# Patient Record
Sex: FEMALE | Race: WHITE
[De-identification: names, ages, dates, MRNs, and addresses within clinical notes are randomized per-mention and may not be internally consistent; named-entity substitution may affect disease eponyms.]

---

## 2018-11-29 ENCOUNTER — HOSPITAL ENCOUNTER (OUTPATIENT)
Dept: HOSPITAL 59 - ER | Age: 53
Setting detail: OBSERVATION
LOS: 2 days | Discharge: HOME | End: 2018-12-01
Attending: INTERNAL MEDICINE | Admitting: INTERNAL MEDICINE
Payer: MEDICARE

## 2018-11-29 DIAGNOSIS — G47.30: ICD-10-CM

## 2018-11-29 DIAGNOSIS — J18.8: Primary | ICD-10-CM

## 2018-11-29 DIAGNOSIS — M19.90: ICD-10-CM

## 2018-11-29 DIAGNOSIS — K21.9: ICD-10-CM

## 2018-11-29 DIAGNOSIS — Z98.890: ICD-10-CM

## 2018-11-29 DIAGNOSIS — M10.9: ICD-10-CM

## 2018-11-29 DIAGNOSIS — R32: ICD-10-CM

## 2018-11-29 DIAGNOSIS — G58.8: ICD-10-CM

## 2018-11-29 DIAGNOSIS — J44.9: ICD-10-CM

## 2018-11-29 DIAGNOSIS — M79.7: ICD-10-CM

## 2018-11-29 DIAGNOSIS — K57.92: ICD-10-CM

## 2018-11-29 DIAGNOSIS — Z87.891: ICD-10-CM

## 2018-11-29 LAB
ANION GAP SERPL CALC-SCNC: 12 MMOL/L (ref 7–16)
APTT BLD: 24.1 SECONDS (ref 24.5–39.1)
BUN SERPL-MCNC: 15 MG/DL (ref 6–20)
CO2 SERPL-SCNC: 26 MMOL/L (ref 22–29)
CREAT SERPL-MCNC: 1.1 MG/DL (ref 0.5–0.9)
ERYTHROCYTE [DISTWIDTH] IN BLOOD BY AUTOMATED COUNT: 13.1 % (ref 11.5–14.5)
EST GLOMERULAR FILTRATION RATE: 55 ML/MIN
GLUCOSE SERPL-MCNC: 152 MG/DL (ref 74–109)
HCT VFR BLD CALC: 40.7 % (ref 35–47)
HGB BLD-MCNC: 13.5 GM/DL (ref 11.6–16)
INR PPP: 1.1
LYMPHOCYTES NFR BLD: 9 % (ref 16–45)
MCH RBC QN AUTO: 30.4 PG (ref 27–33)
MCHC RBC AUTO-ENTMCNC: 33.2 G/DL (ref 32–36)
MCV RBC AUTO: 91.7 FL (ref 81–97)
MONOCYTES NFR BLD: 5 % (ref 0–9)
NEUTROPHILS NFR BLD AUTO: 86 % (ref 47–80)
PLATELET # BLD EST: NORMAL 10*3/UL
PLATELET # BLD: 181 K/UL (ref 130–400)
PMV BLD AUTO: 10.4 FL (ref 7.4–10.4)
PROTHROMBIN TIME: 10.7 SECONDS (ref 9.5–12.1)
RBC # BLD AUTO: 4.44 M/UL (ref 3.8–5.4)
RBC MORPH BLD: NORMAL
WBC # BLD AUTO: 9.9 K/UL (ref 4.2–12.2)

## 2018-11-29 PROCEDURE — 85610 PROTHROMBIN TIME: CPT

## 2018-11-29 PROCEDURE — 71045 X-RAY EXAM CHEST 1 VIEW: CPT

## 2018-11-29 PROCEDURE — 71275 CT ANGIOGRAPHY CHEST: CPT

## 2018-11-29 PROCEDURE — 96365 THER/PROPH/DIAG IV INF INIT: CPT

## 2018-11-29 PROCEDURE — 96374 THER/PROPH/DIAG INJ IV PUSH: CPT

## 2018-11-29 PROCEDURE — 99220: CPT

## 2018-11-29 PROCEDURE — 93005 ELECTROCARDIOGRAM TRACING: CPT

## 2018-11-29 PROCEDURE — 84484 ASSAY OF TROPONIN QUANT: CPT

## 2018-11-29 PROCEDURE — 94010 BREATHING CAPACITY TEST: CPT

## 2018-11-29 PROCEDURE — 93010 ELECTROCARDIOGRAM REPORT: CPT

## 2018-11-29 PROCEDURE — 99285 EMERGENCY DEPT VISIT HI MDM: CPT

## 2018-11-29 PROCEDURE — 80048 BASIC METABOLIC PNL TOTAL CA: CPT

## 2018-11-29 PROCEDURE — 85027 COMPLETE CBC AUTOMATED: CPT

## 2018-11-29 PROCEDURE — 85730 THROMBOPLASTIN TIME PARTIAL: CPT

## 2018-11-29 NOTE — EMERGENCY DEPARTMENT RECORD
History of Present Illness





- General


Chief Complaint: Chest Pain


Stated Complaint: CHEST PAIN


Time Seen by Provider: 18 19:56


Source: Patient, Family


Mode of Arrival: Ambulatory


Limitations: No limitations





- History of Present Illness


Initial Comments: 


54 yo female presents with mid chest pain.  The pain started at the time of her 

shoulder surgery today.  She states her mid chest hurt when she was laid back 

on the surgery table prior to the procedure.  The chest again hurt when she 

awoke from anesthesia.  The pain has been present since this morning around 

8am.  The pain is in the middle of the chest.  It does have a positional 

component to it.  No shortness of breath.  It is worse laying down and better 

standing up.  No cardiac history.  





MD Complaint: Chest pain


Onset/Timin


-: Hour(s)


Onset: Other (onset laying down for surgery today)


Pain Location: Substernal


Severity scale (1-10): 6


Quality: Aching, Dull, Heaviness


Consistency: Constant


Improves With: Leaning foward


Worsens With: Movement, Palpation, Other (Laying down)


Context: Recent surgery (this AM)


Anginal Symptoms: Dyspnea


Treatments Prior to Arrival: None





- Related Data


 Home Medications











 Medication  Instructions  Recorded  Confirmed  Last Taken


 


Oxycodone HCl/Acetaminophen 1 - 2 tab PO Q4H PRN 18





[Percocet 5mg/325mg]    











 Allergies











Allergy/AdvReac Type Severity Reaction Status Date / Time


 


latex Allergy Mild RASH Verified 18 19:29














Travel Screening





- Travel/Exposure Within Last 30 Days


Have you traveled within the last 30 days?: No





- Travel Symptoms


Symptom Screening: None





Review of Systems


Constitutional: Denies: Chills, Fever, Malaise, Weakness


Eyes: Denies: Eye discharge


ENT: Denies: Congestion, Throat pain


Respiratory: Denies: Cough, Dyspnea, Hemoptysis, Stridor, Wheezes


Cardiovascular: Reports: Chest pain.  Denies: Edema, Palpitations, Syncope


Endocrine: Denies: Fatigue


Gastrointestinal: Denies: Abdominal pain, Diarrhea, Nausea, Vomiting


Musculoskeletal: Denies: Arthralgia, Back pain, Myalgia


Skin: Denies: Bruising, Change in color, Rash


Neurological: Denies: Headache


Psychiatric: Denies: Anxiety


Hematological/Lymphatic: Denies: Blood Clots, Easy bleeding, Easy bruising





Past Medical History





- SOCIAL HISTORY


Smoking Status: Former smoker


Alcohol Use: Rare


Drug Use: Heavy


Drug Use Detail:: Marijuana





- RESPIRATORY


Hx Respiratory Disorders: Yes


Hx COPD: Yes (early stages)


Hx Sleep Apnea: Yes


Hx of CPAP: No





- CARDIOVASCULAR


Hx Cardio Disorders: Yes


Hx Palpitations: Yes


Comment:: Thickening around a heart valve





- NEURO


Hx Neuro Disorders: No





- GI


Hx GI Disorders: Yes


Hx Diverticulitis: Yes


Hx Reflux: Yes


Hx of Polyps: Yes (benign)





- 


Hx Genitourinary Disorders: Yes


Hx Bladder Problem: Yes (incontinence)





- ENDOCRINE


Hx Endocrine Disorders: No





- MUSCULOSKELETAL


Hx Musculoskeletal Disorders: Yes


Hx Arthritis: Yes (osteo)


Hx Fibromyalgia: Yes


Hx Gout: Yes





- PSYCH


Hx Psych Problems: Yes


Hx Anxiety: Yes


Hx Depression: Yes





- HEMATOLOGY/ONCOLOGY


Hx Hematology/Oncology Disorders: No





Family Medical History


Any Significant Family History?: Yes


Hx Cancer: Mother


Hx Heart Disease: Father, Brother/Sister





Physical Exam





- General


General Appearance: Alert, Oriented x3, Cooperative, No acute distress


Limitations: No limitations





- Head


Head exam: Atraumatic, Normal inspection





- Eye


Eye exam: Normal appearance.  negative: Conjunctival injection, Scleral icterus





- ENT


ENT exam: Normal exam


Ear exam: Normal external inspection


Nasal Exam: Normal inspection


Mouth exam: Normal external inspection





- Neck


Neck exam: Normal inspection





- Respiratory


Respiratory exam: Normal lung sounds bilaterally, Chest wall tenderness (mid to 

lower ).  negative: Accessory muscle use, Prolonged expiratory, Respiratory 

distress, Rhonchi, Stridor, Wheezes





- Cardiovascular


Cardiovascular Exam: Regular rate, Normal rhythm, Normal heart sounds





- GI/Abdominal


GI/Abdominal exam: Soft.  negative: Tenderness





- Rectal


Rectal exam: Deferred





- 


 exam: Deferred





- Extremities


Extremities exam: Normal inspection, Full ROM, Normal capillary refill.  

negative: Tenderness





- Back


Back exam: Reports: Normal inspection, Full ROM.  Denies: Muscle spasm, Rash 

noted, Tenderness





- Neurological


Neurological exam: Alert, Oriented X3





- Psychiatric


Psychiatric exam: Normal affect, Normal mood





- Skin


Skin exam: Dry, Intact, Normal color, Warm





Course





 Vital Signs











  18





  19:31


 


Temperature 98.4 F


 


Pulse Rate 104 H


 


Respiratory 20





Rate 


 


Blood Pressure 135/95


 


Pulse Ox 97














- Reevaluation(s)


Reevaluation #1: 


EKG 1933 Sinus Tach 107, intervals normal, axis normal, ST normal, No acute 

changes.  Normal except rate.  No significant changes from 18


Her physical examination is consistent with a chest wall etiology.  Her sternum 

is tender in the lower 1/2


The chest pain is atypical in that it started on the OR table as she laid down 

and was there when she awoke.  Atypical for cardiac.  


18 21:36


The labs were reviewed


No significant abnormalities


18 22:32


The troponin is normal


The CT scan was negative for PE, there is elevation of the right hemidiaphragm 

and lower lobe infiltrate.


18 22:56


Given the right lower lobe infiltrate I recommend admission for antibiotics and 

monitor overnight


18 00:15








Medical Decision Making





- Lab Data


Result diagrams: 


 18 19:40





 18 19:40





 Lab Results











  18 Range/Units





  19:40 


 


WBC  9.9  (4.2-12.2)  K/uL


 


RBC  4.44  (3.80-5.40)  M/uL


 


Hgb  13.5  (11.6-16.0)  gm/dl


 


Hct  40.7  (35.0-47.0)  %


 


MCV  91.7  (81-97)  fl


 


MCH  30.4  (27-33)  pg


 


MCHC  33.2  (32-36)  g/dl


 


RDW  13.1  (11.5-14.5)  %


 


Plt Count  181  (130-400)  K/uL


 


MPV  10.4  (7.4-10.4)  fl


 


Eosinophils %  Not Reportable  


 


Basophils %  Not Reportable  














Disposition


Disposition: Admit


Clinical Impression: 


 Pneumonia





Disposition: Still a Patient at [facility]


Decision to Admit: Admit from ER


Decision to Admit Date: 18


Decision to Admit Time: 22:41


Condition: (2) Stable


Forms:  Patient Portal Access


Time of Disposition: 22:41





Quality





- Quality Measures


Quality Measures: N/A





- Blood Pressure Screening


Does Patient Have Any of the Following: No


Blood Pressure Classification: Hypertensive Reading


Systolic Measurement: 135


Diastolic Measurement: 95


Screening for High Blood Pressure: < Pre-Hypertensive BP, F/U Documented > [

]


Pre-Hypertensive Follow-up Interventions: Referral to alternative/primary care 

provider.

## 2018-11-30 RX ADMIN — OXYCODONE HYDROCHLORIDE AND ACETAMINOPHEN PRN UDTAB: 5; 325 TABLET ORAL at 07:06

## 2018-11-30 RX ADMIN — PANTOPRAZOLE SODIUM SCH MG: 40 TABLET, DELAYED RELEASE ORAL at 17:33

## 2018-11-30 RX ADMIN — MORPHINE SULFATE PRN MG: 10 INJECTION INTRAVENOUS at 21:45

## 2018-11-30 RX ADMIN — OXYCODONE HYDROCHLORIDE AND ACETAMINOPHEN PRN UDTAB: 5; 325 TABLET ORAL at 11:22

## 2018-11-30 RX ADMIN — DOCUSATE SODIUM SCH MG: 100 TABLET, FILM COATED ORAL at 10:03

## 2018-11-30 RX ADMIN — OXYCODONE HYDROCHLORIDE AND ACETAMINOPHEN PRN UDTAB: 5; 325 TABLET ORAL at 19:24

## 2018-11-30 RX ADMIN — PANTOPRAZOLE SODIUM SCH MG: 40 TABLET, DELAYED RELEASE ORAL at 00:53

## 2018-11-30 RX ADMIN — DOCUSATE SODIUM SCH MG: 100 TABLET, FILM COATED ORAL at 21:37

## 2018-11-30 RX ADMIN — PANTOPRAZOLE SODIUM SCH MG: 40 TABLET, DELAYED RELEASE ORAL at 07:06

## 2018-11-30 RX ADMIN — OXYCODONE HYDROCHLORIDE AND ACETAMINOPHEN PRN UDTAB: 5; 325 TABLET ORAL at 23:38

## 2018-11-30 RX ADMIN — OXYCODONE HYDROCHLORIDE AND ACETAMINOPHEN PRN UDTAB: 5; 325 TABLET ORAL at 15:10

## 2018-11-30 RX ADMIN — OXYCODONE HYDROCHLORIDE AND ACETAMINOPHEN PRN UDTAB: 5; 325 TABLET ORAL at 03:16

## 2018-11-30 RX ADMIN — AZITHROMYCIN SCH MG: 500 TABLET, FILM COATED ORAL at 09:55

## 2018-11-30 RX ADMIN — MORPHINE SULFATE PRN MG: 10 INJECTION INTRAVENOUS at 09:59

## 2018-11-30 RX ADMIN — FLUTICASONE PROPIONATE SCH: 50 SPRAY, METERED NASAL at 21:37

## 2018-11-30 NOTE — HISTORY & PHYSICAL
History of Present Illness





- Date of Service


Date of Service for History & Physical: 12/01/18





- History of Present Illness


Admitting Diagnosis: pneumonia


History of Present Illness: 


52 yo female admitted for PNA s/p right rotator cuff repair. Pt reports having 

substernal chest pain that started when she was transferred  from stretcher to 

surgical table for rotator cuff surgery. Pt states that they repositioned her, 

pain continued, surgery progressed as usual. Pt awoke from surgery and 

continued to have pain. Was d/c'd from day surgery went home with continued CP 

and then progressed to having difficulty breathing with c/o difficulty taking a 

deep breath. Pt had a nerve block placed in pre-op, went to surgical suite and 

then had cp when laid flat. 





Pt to [facility] ER 11/29/18 for CP.


EKG ST, no acute changes and no changes from 4/9/18.


CT negative for PE but shows right LL PNA and right hemidiaphragm. 


/91, , RR 18, 93% RA, 98.3F


WBC 9.9, Hgb 13.5, Hct 40.7, Plt 181


INR 1.1, PT 10.7


, K 4.4, Cl 103, CO2 26, BUN 15, Cr 1.1, GFR 55, glucose 152, trop <0.010


Given 1L NS, percocet, Rocephin 1gm, Zithromax 500mg PO


Admission for PNA




















11/30/18


Pt resting in bed, no acute distress. A&ox4, sitting up and playing video games 

on ipad. Pt had right shoulder immobilizer placed from surgery that remains 

intact. Denies SOB, using incentive spirometer but having low lung volume. 


Pt denies any CXR previous to surgery, stopped smoking 9/27/18, no known COPD. 


Repeat CXR shows mild hemidiaphragm still but pt remains asymptomatic at this 

time.





Dr Greene consulted about issues, reports rare potential from hitting phrenic 

nerve with should block. Ok to d/c in AM if medically stable, follow up as 

directed from post op instructions. 








PCP Omaira Clark


Specialist Carol





Travel Screening





- Travel/Exposure Within Last 30 Days


Have you traveled within the last 30 days?: No





- Travel/Exposure Within Last Year


Have you traveled outside the [place] in the last year?: No





- Additonal Travel Details


Have you been exposed to anyone with a communicable illness?: No





- Travel Symptoms


Symptom Screening: None





Review of Systems


Constitutional: Denies: Chills, Fever, Malaise, Weakness


Eyes: Denies: Eye discharge


ENT: Denies: Congestion, Throat pain


Respiratory: Denies: Cough, Dyspnea, Hemoptysis, Stridor, Wheezes


Cardiovascular: Reports: Chest pain.  Denies: Edema, Palpitations, Syncope


Endocrine: Denies: Fatigue


Gastrointestinal: Denies: Abdominal pain, Diarrhea, Nausea, Vomiting


Musculoskeletal: Denies: Arthralgia, Back pain, Myalgia


Skin: Denies: Bruising, Change in color, Rash


Neurological: Denies: Headache


Psychiatric: Denies: Anxiety


Hematological/Lymphatic: Denies: Blood Clots, Easy bleeding, Easy bruising





Past Medical History





- SOCIAL HISTORY


Smoking Status: Former smoker


Alcohol Use: Rare


Drug Use: Heavy


Drug Use Detail:: Marijuana





- RESPIRATORY


Hx Respiratory Disorders: Yes


Hx COPD: Yes (early stages)


Hx Sleep Apnea: Yes


Hx of CPAP: No





- CARDIOVASCULAR


Hx Cardio Disorders: Yes


Hx Palpitations: Yes


Comment:: Thickening around a heart valve





- NEURO


Hx Neuro Disorders: No





- GI


Hx GI Disorders: Yes


Hx Diverticulitis: Yes


Hx Reflux: Yes


Hx of Polyps: Yes (benign)





- 


Hx Genitourinary Disorders: Yes


Hx Bladder Problem: Yes (incontinence)





- ENDOCRINE


Hx Endocrine Disorders: No





- MUSCULOSKELETAL


Hx Musculoskeletal Disorders: Yes


Hx Arthritis: Yes (osteo)


Hx Fibromyalgia: Yes


Hx Gout: Yes





- PSYCH


Hx Psych Problems: Yes


Hx Anxiety: Yes


Hx Depression: Yes





- HEMATOLOGY/ONCOLOGY


Hx Hematology/Oncology Disorders: No





Family Medical History


Any Significant Family History?: Yes


Hx Cancer: Mother


Hx Heart Disease: Father, Brother/Sister





H&P Meds/Allergies





- Allergies


Allergies: 


 Allergies











Allergy/AdvReac Type Severity Reaction Status Date / Time


 


latex Allergy Mild RASH Verified 11/29/18 19:29














- Home Medications


 Home Medications











 Medication  Instructions  Recorded  Confirmed  Last Taken


 


Oxycodone HCl/Acetaminophen 1 - 2 tab PO Q4H PRN 11/29/18 11/29/18 11/29/18





[Percocet 5mg/325mg]    














- Active Medications


Active Medications: 


 Current Medications





Acetaminophen (Tylenol 500mg Tab)  1,000 mg PO Q6H PRN


   PRN Reason: PAIN - MILD(1-4)/FEVER


Albuterol Sulfate ()  2.5 mg INH RESP.Q4H PRN


   PRN Reason: DIFFICULTY IN BREATHING


Azithromycin (Zithromax)  500 mg PO DAILY [facility]


Fluticasone Propionate (Flonase)  2 spray NA DAILY [facility]


Sodium Chloride ()  1,000 mls @ 125 mls/hr IV .Q8H PRN


   PRN Reason: LARGE VOLUME IV


Ceftriaxone Sodium 1 gm/ (Sodium Chloride)  100 mls @ 100 mls/hr IVPB Q24H [facility]


   Stop: 12/06/18 00:01


Montelukast Sodium (Singulair)  10 mg PO QHS [facility]


Morphine Sulfate (Morphine Sulfate)  5 mg IVP Q6H PRN


   PRN Reason: PAIN - MILD (1-4)


Non-Formulary Medication (Famciclovir [Famciclovir])  1,500 mg PO ASDIR [facility]


Oxycodone/Acetaminophen (Percocet 5-325 Mg Tablet)  1 udtab PO Q4H PRN


   PRN Reason: PAIN - MILD TO MODERATE (1-7)


Oxycodone/Acetaminophen (Percocet 5-325 Mg Tablet)  2 udtab PO Q4H PRN


   PRN Reason: PAIN - MOD TO SEVERE (5-10)


   Last Admin: 11/30/18 07:06 Dose:  2 udtab


Pantoprazole Sodium (Protonix)  40 mg PO BIDAC [facility]


   Last Admin: 11/30/18 07:06 Dose:  40 mg











Physical Exam





- Vital Signs


Vital Signs: 


 Vital Signs - Last 24 Hrs











  Temp Pulse Pulse Resp BP BP Pulse Ox


 


 11/30/18 07:42  97.9 F   77  16   140/92  95


 


 11/30/18 00:26  97.9 F   89  20   147/96  94 L


 


 11/29/18 23:43    95 H  18   128/86  93 L


 


 11/29/18 22:15    93 H  20   123/88  94 L


 


 11/29/18 22:01    96 H  22   123/88  95


 


 11/29/18 20:52    96 H  24   124/94  92 L


 


 11/29/18 19:31  98.4 F  104 H   20  135/95   97














- General


General Appearance: Alert, Oriented x3, Cooperative, No acute distress


Limitations: No limitations





- Head


Head exam: Atraumatic, Normal inspection





- Eye


Eye exam: Normal appearance.  negative: Conjunctival injection, Scleral icterus





- ENT


ENT exam: Normal exam


Ear exam: Normal external inspection


Nasal Exam: Normal inspection


Mouth exam: Normal external inspection





- Neck


Neck exam: Normal inspection





- Respiratory


Respiratory exam: Normal lung sounds bilaterally, Chest wall tenderness (mid to 

lower ).  negative: Accessory muscle use, Prolonged expiratory, Respiratory 

distress, Rhonchi, Stridor, Wheezes





- Cardiovascular


Cardiovascular Exam: Regular rate, Normal rhythm, Normal heart sounds


Peripheral Pulses: 3+: Radial (R), Radial (L), Dorsalis Pedis (R), Dorsalis 

Pedis (L)





- GI/Abdominal


GI/Abdominal exam: Soft.  negative: Tenderness





- Rectal


Rectal exam: Deferred





- 


 exam: Deferred





- Extremities


Extremities exam: Normal inspection, Full ROM, Normal capillary refill.  

negative: Tenderness





- Back


Back exam: Reports: Normal inspection, Full ROM.  Denies: Muscle spasm, Rash 

noted, Tenderness





- Neurological


Neurological exam: Alert, Oriented X3





- Psychiatric


Psychiatric exam: Normal affect, Normal mood





- Skin


Skin exam: Dry, Intact, Normal color, Warm





Results





- Labs


Result Diagrams: 


 11/29/18 19:40





 11/29/18 19:40


Labs Last 24 Hours: 


 Laboratory Results - last 24 hr











  11/29/18 11/29/18 11/29/18





  19:40 19:40 19:40


 


WBC  9.9  


 


RBC  4.44  


 


Hgb  13.5  


 


Hct  40.7  


 


MCV  91.7  


 


MCH  30.4  


 


MCHC  33.2  


 


RDW  13.1  


 


Plt Count  181  


 


MPV  10.4  


 


Neutrophils %  86.0 H  


 


Eosinophils %  Not Reportable  


 


Basophils %  Not Reportable  


 


Lymphocytes  9.0 L  


 


Monocytes  5.0  


 


Platelet Estimate  Normal  


 


RBC Morphology  Normal  


 


PT   10.7 


 


INR   1.1 


 


APTT   24.1 L 


 


Sodium    141


 


Potassium    4.4


 


Chloride    103


 


Carbon Dioxide    26.0


 


Anion Gap    12.0


 


BUN    15


 


Creatinine    1.1 H


 


Estimated GFR    55


 


Random Glucose    152 H


 


Calcium    9.0


 


Troponin T   














  11/29/18 11/30/18





  19:40 04:00


 


WBC  


 


RBC  


 


Hgb  


 


Hct  


 


MCV  


 


MCH  


 


MCHC  


 


RDW  


 


Plt Count  


 


MPV  


 


Neutrophils %  


 


Eosinophils %  


 


Basophils %  


 


Lymphocytes  


 


Monocytes  


 


Platelet Estimate  


 


RBC Morphology  


 


PT  


 


INR  


 


APTT  


 


Sodium  


 


Potassium  


 


Chloride  


 


Carbon Dioxide  


 


Anion Gap  


 


BUN  


 


Creatinine  


 


Estimated GFR  


 


Random Glucose  


 


Calcium  


 


Troponin T  < 0.010  < 0.010














- Imaging and Cardiology


  ** CT scan - chest


Status: Report reviewed





VTE H&P Assessment





- Risk for VTE


Risk for VTE: Yes


Risk Level: Moderate


Risk Assessment Date: 12/01/18


Risk Assessment Time: 10:24


VTE Orders Placed or Will Be Placed: No


VTE Reason for No Prophylaxis: Not Indicated (pt d/c today 12/1/18)





Plan





- Detailed Diagnosis and Plan


(1) Pneumonia


Current Visit: Yes   Status: Acute   Base Code: J18.9 - PNEUMONIA, UNSPECIFIED 

ORGANISM   Comment: 11/30/18


-continue Rocephin 1gm and Zithromax 500mg


-continue insentive spirometer 10/hr while awake


-report any SOB/MADDISON immediately


-repeat CXR show mild hemidiaphragm right, pt asymptomatic


   





(2) Phrenic nerve palsy


Current Visit: Yes   Status: Acute   Base Code: G58.8 - OTHER SPECIFIED 

MONONEUROPATHIES   Comment: 11/30/18


-Dr Medina consulted, reports possible phrenic nerve involvement with right 

shoulder nerve block


-D/C when medically stable as block will wear off   





(3) Full code status


Current Visit: Yes   Status: Acute   Base Code: Z78.9 - OTHER SPECIFIED HEALTH 

STATUS   Comment: 11/30/18


-full code

## 2018-12-01 RX ADMIN — OXYCODONE HYDROCHLORIDE AND ACETAMINOPHEN PRN UDTAB: 5; 325 TABLET ORAL at 04:50

## 2018-12-01 RX ADMIN — OXYCODONE HYDROCHLORIDE AND ACETAMINOPHEN PRN UDTAB: 5; 325 TABLET ORAL at 08:46

## 2018-12-01 RX ADMIN — DOCUSATE SODIUM SCH MG: 100 TABLET, FILM COATED ORAL at 09:23

## 2018-12-01 RX ADMIN — AZITHROMYCIN SCH MG: 500 TABLET, FILM COATED ORAL at 09:33

## 2018-12-01 RX ADMIN — FLUTICASONE PROPIONATE SCH: 50 SPRAY, METERED NASAL at 10:31

## 2018-12-01 RX ADMIN — PANTOPRAZOLE SODIUM SCH MG: 40 TABLET, DELAYED RELEASE ORAL at 06:49

## 2018-12-01 NOTE — DISCHARGE SUMMARY
Providers


Discharge Summary Date: 12/01/18


Date of admission: 


11/30/18 00:20





Expected Date of Discharge: 12/01/18


Attending physician: 


ADELAIDE ZHAO





Primary care physician: 


Omaira Clark N.P.








Physical Exam





- Vital Signs


Vital Signs: 


 Vital Signs - Last 24 Hrs











  Temp Pulse Resp BP Pulse Ox


 


 12/01/18 08:54    20  


 


 11/30/18 20:15  98.2 F  75  18  144/90  95


 


 11/30/18 16:00  98.3 F  108 H  18  143/91  93 L














- General


General Appearance: Alert, Oriented x3, Cooperative, No acute distress


Limitations: No limitations





- Head


Head exam: Atraumatic, Normal inspection





- Eye


Eye exam: Normal appearance.  negative: Conjunctival injection, Scleral icterus





- ENT


ENT exam: Normal exam


Ear exam: Normal external inspection


Nasal Exam: Normal inspection


Mouth exam: Normal external inspection





- Neck


Neck exam: Normal inspection





- Respiratory


Respiratory exam: Normal lung sounds bilaterally, Chest wall tenderness (mid to 

lower ).  negative: Accessory muscle use, Prolonged expiratory, Respiratory 

distress, Rhonchi, Stridor, Wheezes





- Cardiovascular


Cardiovascular Exam: Regular rate, Normal rhythm, Normal heart sounds


Peripheral Pulses: 3+: Radial (R), Radial (L), Dorsalis Pedis (R), Dorsalis 

Pedis (L)





- GI/Abdominal


GI/Abdominal exam: Soft, Normal bowel sounds.  negative: Tenderness





- Rectal


Rectal exam: Deferred





- 


 exam: Deferred





- Extremities


Extremities exam: Normal inspection, Full ROM, Normal capillary refill.  

negative: Tenderness





- Back


Back exam: Reports: Normal inspection, Full ROM.  Denies: Muscle spasm, Rash 

noted, Tenderness





- Neurological


Neurological exam: Alert, Oriented X3





- Psychiatric


Psychiatric exam: Normal affect, Normal mood





- Skin


Skin exam: Dry, Intact, Normal color, Warm





Hospitalization





- Hospitalization


Admission Diagnosis: pneumonia





- Problem List/Discharge Diagnosis


(1) Pneumonia


Current Visit: Yes   Status: Acute   Base Code: J18.9 - PNEUMONIA, UNSPECIFIED 

ORGANISM   Comment: 11/30/18


-continue Rocephin 1gm and Zithromax 500mg


-continue insentive spirometer 10/hr while awake


-report any SOB/MADDISON immediately


-repeat CXR show mild hemidiaphragm right, pt asymptomatic


12/1/18


-pt reports feeling well, wants to go home


-incentive spirometer 1750 lung capacity


-no CP, SOB, MADDISON reported (mild discomfort with IS use but reolves with rest)


   





(2) Phrenic nerve palsy


Current Visit: Yes   Status: Acute   Base Code: G58.8 - OTHER SPECIFIED 

MONONEUROPATHIES   Comment: 11/30/18


-Dr Medina consulted, reports possible phrenic nerve involvement with right 

shoulder nerve block


-D/C when medically stable as block will wear off





12/1/18


-D/C home


-f/u PCP 1-2 weeks


-repeat CXR 5 days   





(3) Full code status


Current Visit: Yes   Status: Acute   Base Code: Z78.9 - OTHER SPECIFIED HEALTH 

STATUS   Comment: 12/1/18


-full code   





- Disposition


Pt in no distress, using IS with improved results. Tolerating Po ABX. Denies SOB

/MADDISON or CP at rest. 


F/U PCP, Cardiology, and ortho


Cardiac enzymes negative, probable cause of pain is phrenic nerve palsy r/t 

right should nerve block. Medically stable. 


Go to ER for SOB, MADDISON, Cp that is radiating to left arm, jaw or that is 

breaking through current pain medications. Continue IS








- Hospitalization Course


Disposition: Home, Self-Care


Hospital Course: 


52 yo female admitted for PNA s/p right rotator cuff repair. Pt reports having 

substernal chest pain that started when she was transferred  from stretcher to 

surgical table for rotator cuff surgery. Pt states that they repositioned her, 

pain continued, surgery progressed as usual. Pt awoke from surgery and 

continued to have pain. Was d/c'd from day surgery went home with continued CP 

and then progressed to having difficulty breathing with c/o difficulty taking a 

deep breath. Pt had a nerve block placed in pre-op, went to surgical suite and 

then had cp when laid flat. 





Pt to [facility] ER 11/29/18 for CP.


EKG ST, no acute changes and no changes from 4/9/18.


CT negative for PE but shows right LL PNA and right hemidiaphragm. 


/91, , RR 18, 93% RA, 98.3F


WBC 9.9, Hgb 13.5, Hct 40.7, Plt 181


INR 1.1, PT 10.7


, K 4.4, Cl 103, CO2 26, BUN 15, Cr 1.1, GFR 55, glucose 152, trop <0.010


Given 1L NS, percocet, Rocephin 1gm, Zithromax 500mg PO


Admission for PNA




















11/30/18


Pt resting in bed, no acute distress. A&ox4, sitting up and playing video games 

on ipad. Pt had right shoulder immobilizer placed from surgery that remains 

intact. Denies SOB, using incentive spirometer but having low lung volume. 


Pt denies any CXR previous to surgery, stopped smoking 9/27/18, no known COPD. 


Repeat CXR shows mild hemidiaphragm still but pt remains asymptomatic at this 

time.





Dr Greene consulted about issues, reports rare potential from hitting phrenic 

nerve with should block. Ok to d/c in AM if medically stable, follow up as 

directed from post op instructions. 








PCP Omaira Clark


Specialist Carol


Procedures: 


Imaging and X-Rays





11/29/18 20:56


CHEST CTA w contrast [CTA] Stat 





11/30/18 09:29


CHEST 1 VIEW [RAD] Stat 








Cardiology Procedures





11/29/18 19:30


Cardiac Monitor NOW 


EKG NOW 





11/30/18 00:26


Cardiac Monitor .Continuous 


Cardiac Monitor .Continuous 











Abnormal Labs: 


 Abnormal Lab Results











  11/29/18 11/29/18 11/29/18 Range/Units





  19:40 19:40 19:40 


 


Neutrophils %  86.0 H    (47-80)  %


 


Lymphocytes  9.0 L    (16-45)  %


 


APTT   24.1 L   (24.5-39.1)  SECONDS


 


Creatinine    1.1 H  (0.5-0.9)  mg/dL


 


Random Glucose    152 H  ()  mg/dL











Condition at Discharge: (2) Stable





Discharge Medications





- Discharge Medications


Prescriptions: 


Azithromycin [Zithromax] 250 mg PO DAILY 4 Days #4 tab


Home Medications: 


 Ambulatory Orders





Oxycodone HCl/Acetaminophen [Percocet 5mg/325mg] 1 - 2 tab PO Q4H PRN 11/29/18 [

Last Taken 11/29/18]


Acetaminophen [Tylenol 500Mg Tab] 1,000 mg PO Q6H PRN  tablet 12/01/18 [Last 

Taken Unknown]


Azithromycin [Zithromax] 250 mg PO DAILY 4 Days #4 tab 12/01/18 [Last Taken 

Unknown]


Docusate Sodium [Colace] 100 mg PO BID  cap 12/01/18 [Last Taken Unknown]











Discharge Plan





- Discharge Instructions


Activity at Discharge: Increase Activity as Tolerated (as instructed by ortho)


Diet at Discharge: Regular Diet


Wound Primary Dressing Type: as instructed by ortho


Instructions:  Community Acquired Pneumonia (DC)


Additional Instructions: 


Appointments have been scheduled for you as follows:


Omaira Clark at [facility] on 12/18 at 2:20PM


Dr. De León at [facility] on 12/28 at 3:00PM


Resume home meds


Zithromax once a day until gone.  Start tomorrow.


Diet as tolerated


Follow your surgeon's discharge instructions


Return to the ED if experiencing shortness of breath, ache or numbness in left 

arm or jaw, unrelenting chest pain.


Chest x-ray in one week.


Continue using your incentive spirameter 10 times an hour while awake. 








Quality Measures





- Quality Measures


Quality Measures: Documentation of Current Medications in Medical Record, 

Screening for High Blood Pressure and F/U Documented





- Current Medications


Quality Measure: Measure #130: Documentation of Current Medications


Documentation of Current Medications: <Current Medications Documented/Reviewed> 

[]





- Blood Pressure Screening


Quality Measure: Screening for High Blood Pressure and Follow-Up Documented


Does Patient Have Any of the Following: No


Blood Pressure Classification: Hypertensive Reading


Systolic Measurement: 135


Diastolic Measurement: 95


Screening for High Blood Pressure: < First Hypertensive BP, F/U Documented > [

]


First Hypertensive Follow-up Interventions: Lifestyle modifications., Referral 

to alternative/primary care provider.


Lifestyle Modification: Dietary Approaches to Stop Hypertension (DASH) Eating 

Plan, Increased Physical Activity





- Elder Abuse Suspicion Index


EASI Reference Information: Christine HINTON, Edilberto C, Tushar D, Yordy DC.Development and validation of a tool to assist physicians identification of 

elder abuse: The Elder Abuse Suspicion  Index (EASI ).  Journal of Elder Abuse 

and Neglect, 2008; 20 (3): 276-300.

## 2018-12-02 NOTE — RADIOLOGY REPORT
DATE:  11/30/2018.



EXAM:  PORTABLE CHEST.



HISTORY:  DIFFICULTY IN BREATHING.



COMPARISON:  11/29/2018.



TECHNIQUE:  Portable AP upright view of the chest was performed.  



FINDINGS:  Heart size is normal.  Tortuous thoracic aorta.  Linear infiltrate 
right lung base.  Mild elevation of the right hemidiaphragm.



IMPRESSION:  

1.  LINEAR INFILTRATE RIGHT LUNG BASE.  

2.  MILD ELEVATION OF THE RIGHT HEMIDIAPHRAGM.



JOB NUMBER:  [id number]
MTDD